# Patient Record
Sex: MALE | Race: WHITE | NOT HISPANIC OR LATINO | URBAN - METROPOLITAN AREA
[De-identification: names, ages, dates, MRNs, and addresses within clinical notes are randomized per-mention and may not be internally consistent; named-entity substitution may affect disease eponyms.]

---

## 2017-10-28 ENCOUNTER — AMBULATORY - HEALTHEAST (OUTPATIENT)
Dept: NURSING | Facility: CLINIC | Age: 37
End: 2017-10-28

## 2019-12-19 ENCOUNTER — COMMUNICATION - HEALTHEAST (OUTPATIENT)
Dept: SCHEDULING | Facility: CLINIC | Age: 39
End: 2019-12-19

## 2021-06-04 NOTE — TELEPHONE ENCOUNTER
Please notify parents that they will have to contact their providers for Tamiflu if they want this. I don't prescribe for adults.

## 2021-06-04 NOTE — TELEPHONE ENCOUNTER
Medication Request  Medication name: Tamiflu  Pharmacy Name and Location: [facility] ([facility] [place])  Reason for request: Caller is patient's spouse, Meenakshi.  Caller reported their daughter was seen today and tested positive for Influenza B.  Caller stated the provider that saw her daughter sent a Tamiflu prescription to the pharmacy for their other two children and forgot to ask for a prescription for her  as well.  When did you use medication last?:  Has never used.   Patient offered appointment:  patient declined  Okay to leave a detailed message: yes  [phone]